# Patient Record
Sex: MALE | Race: WHITE | NOT HISPANIC OR LATINO | Employment: FULL TIME | URBAN - METROPOLITAN AREA
[De-identification: names, ages, dates, MRNs, and addresses within clinical notes are randomized per-mention and may not be internally consistent; named-entity substitution may affect disease eponyms.]

---

## 2017-03-13 RX ORDER — METOPROLOL TARTRATE 50 MG/1
TABLET ORAL
Qty: 60 TABLET | Refills: 3 | Status: SHIPPED | OUTPATIENT
Start: 2017-03-13 | End: 2017-07-24 | Stop reason: SDUPTHER

## 2017-04-03 RX ORDER — CLOPIDOGREL BISULFATE 75 MG/1
TABLET ORAL
Qty: 30 TABLET | Refills: 6 | Status: SHIPPED | OUTPATIENT
Start: 2017-04-03

## 2017-05-07 RX ORDER — ATORVASTATIN CALCIUM 80 MG/1
TABLET, FILM COATED ORAL
Qty: 90 TABLET | Refills: 0 | Status: SHIPPED | OUTPATIENT
Start: 2017-05-07 | End: 2017-08-29 | Stop reason: SDUPTHER

## 2017-07-25 RX ORDER — METOPROLOL TARTRATE 50 MG/1
50 TABLET ORAL 2 TIMES DAILY
Qty: 60 TABLET | Refills: 2 | Status: SHIPPED | OUTPATIENT
Start: 2017-07-25

## 2017-07-25 NOTE — TELEPHONE ENCOUNTER
Please inform Mr Haleigh, I refilled has recent medication request, however he needs a clinic appointment.

## 2017-07-27 ENCOUNTER — TELEPHONE (OUTPATIENT)
Dept: CARDIOLOGY | Facility: CLINIC | Age: 52
End: 2017-07-27

## 2017-07-27 NOTE — TELEPHONE ENCOUNTER
----- Message from Ivette Saini sent at 7/27/2017  2:36 PM CDT -----  Contact: pt daughter  Returning nurse call. Please adv/call [phone].//thanks. cw

## 2017-07-27 NOTE — TELEPHONE ENCOUNTER
Returned pt call spoke with pt daughter states she will talk with financial department regarding pt account being blocked.

## 2017-08-29 RX ORDER — ATORVASTATIN CALCIUM 80 MG/1
TABLET, FILM COATED ORAL
Qty: 30 TABLET | Refills: 0 | Status: SHIPPED | OUTPATIENT
Start: 2017-08-29 | End: 2017-09-25 | Stop reason: SDUPTHER

## 2017-09-25 RX ORDER — ATORVASTATIN CALCIUM 80 MG/1
TABLET, FILM COATED ORAL
Qty: 30 TABLET | Refills: 0 | Status: SHIPPED | OUTPATIENT
Start: 2017-09-25 | End: 2017-10-13 | Stop reason: SDUPTHER

## 2017-10-17 RX ORDER — ATORVASTATIN CALCIUM 80 MG/1
TABLET, FILM COATED ORAL
Qty: 30 TABLET | Refills: 0 | Status: SHIPPED | OUTPATIENT
Start: 2017-10-17

## 2017-11-15 RX ORDER — CLOPIDOGREL BISULFATE 75 MG/1
TABLET ORAL
Qty: 30 TABLET | Refills: 3 | OUTPATIENT
Start: 2017-11-15

## 2017-11-15 RX ORDER — METOPROLOL TARTRATE 50 MG/1
50 TABLET ORAL 2 TIMES DAILY
Qty: 60 TABLET | Refills: 2 | OUTPATIENT
Start: 2017-11-15

## 2017-12-06 RX ORDER — ATORVASTATIN CALCIUM 80 MG/1
TABLET, FILM COATED ORAL
Qty: 30 TABLET | Refills: 0 | OUTPATIENT
Start: 2017-12-06

## 2017-12-13 RX ORDER — METOPROLOL TARTRATE 50 MG/1
50 TABLET ORAL 2 TIMES DAILY
Qty: 60 TABLET | Refills: 2 | OUTPATIENT
Start: 2017-12-13

## 2017-12-13 RX ORDER — CLOPIDOGREL BISULFATE 75 MG/1
TABLET ORAL
Qty: 30 TABLET | Refills: 3 | OUTPATIENT
Start: 2017-12-13

## 2017-12-13 RX ORDER — ATORVASTATIN CALCIUM 80 MG/1
TABLET, FILM COATED ORAL
Qty: 30 TABLET | Refills: 0 | OUTPATIENT
Start: 2017-12-13

## 2017-12-22 RX ORDER — METOPROLOL TARTRATE 50 MG/1
50 TABLET ORAL 2 TIMES DAILY
Qty: 60 TABLET | Refills: 2 | OUTPATIENT
Start: 2017-12-22

## 2018-02-16 ENCOUNTER — TELEPHONE (OUTPATIENT)
Dept: INTERNAL MEDICINE | Facility: CLINIC | Age: 53
End: 2018-02-16

## 2018-02-16 NOTE — TELEPHONE ENCOUNTER
Please notify patient that if he plans to continue primary care with us, he needs follow up. He has only been seen once for hospital follow up 2 years ago. Recent note from his Cardiologist states that he will continue to follow with us for hyperlipidemia.

## 2019-07-14 ENCOUNTER — HOSPITAL ENCOUNTER (EMERGENCY)
Facility: HOSPITAL | Age: 54
Discharge: HOME | End: 2019-07-14
Attending: EMERGENCY MEDICINE
Payer: MEDICAID

## 2019-07-14 VITALS
TEMPERATURE: 98 F | OXYGEN SATURATION: 96 % | SYSTOLIC BLOOD PRESSURE: 143 MMHG | BODY MASS INDEX: 36.17 KG/M2 | RESPIRATION RATE: 18 BRPM | WEIGHT: 272.94 LBS | DIASTOLIC BLOOD PRESSURE: 92 MMHG | HEIGHT: 73 IN | HEART RATE: 56 BPM

## 2019-07-14 DIAGNOSIS — R23.4 WOUND ESCHAR OF FOOT: ICD-10-CM

## 2019-07-14 DIAGNOSIS — L03.116 CELLULITIS OF LEFT FOOT EXCLUDING TOES: Primary | ICD-10-CM

## 2019-07-14 DIAGNOSIS — S99.922A INJURY OF LEFT FOOT: ICD-10-CM

## 2019-07-14 LAB — POCT GLUCOSE: 108 MG/DL (ref 70–110)

## 2019-07-14 PROCEDURE — 25000003 PHARM REV CODE 250: Performed by: EMERGENCY MEDICINE

## 2019-07-14 PROCEDURE — 99283 EMERGENCY DEPT VISIT LOW MDM: CPT | Mod: 25

## 2019-07-14 PROCEDURE — 82962 GLUCOSE BLOOD TEST: CPT

## 2019-07-14 RX ORDER — DOXYCYCLINE 100 MG/1
100 CAPSULE ORAL 2 TIMES DAILY
Qty: 20 CAPSULE | Refills: 0 | Status: SHIPPED | OUTPATIENT
Start: 2019-07-14 | End: 2019-07-24

## 2019-07-14 RX ORDER — ALBUTEROL SULFATE 90 UG/1
90 AEROSOL, METERED RESPIRATORY (INHALATION) 2 TIMES DAILY PRN
COMMUNITY
Start: 2019-04-18

## 2019-07-14 RX ADMIN — NEOMYCIN AND POLYMYXIN B SULFATES AND BACITRACIN ZINC 1 EACH: 400; 3.5; 5 OINTMENT TOPICAL at 07:07

## 2019-07-14 NOTE — ED PROVIDER NOTES
SCRIBE #1 NOTE: I, Lili Johansen, am scribing for, and in the presence of, Tonya Cox MD. I have scribed the entire note.      History      Chief Complaint   Patient presents with    Foot Injury     Wound to R foot since thursday. Black noted to foot with redness/+swelling Pt said he spilled bleach on his foot then wound formed.        Review of patient's allergies indicates:  No Known Allergies     HPI   HPI    7/14/2019, 6:59 PM   History obtained from the patient and daughter      History of Present Illness: Lola Ricardo is a 54 y.o. male patient with PMHx of HTN and a heart attack who presents to the Emergency Department for a wound to the R foot with erythema and swelling. Pt states he spilled bleach on his foot prior to the wound forming about 3 days ago.Pt states he was wearing shoes and socks at the time of the spill. Symptoms are constant and moderate in severity. No mitigating or exacerbating factors reported. Associated sxs include black noted on R foot. Patient denies any fever, chills, diaphoresis, n/v/d, joint swelling, rash, HA, lightheadedness, extremity weakness/numbness, and all other sxs at this time. Pt denies DM. No prior txs reproted. No further complaints or concerns at this time.          Arrival mode: Personal vehicle     PCP: Veronique Dos Santos MD       Past Medical History:  Past Medical History:   Diagnosis Date    Heart attack 02/2016    Hypertension        Past Surgical History:  Past Surgical History:   Procedure Laterality Date    CHOLECYSTECTOMY      CORONARY STENT PLACEMENT  02/2016    GALLBLADDER SURGERY Bilateral     none      ROBOTIC ASSISTED -LAPAROSCOPIC CHOLECYSTECTOMY N/A 5/21/2015    Performed by Kojo Beauchamp MD at [facility]         Family History:  Family History   Problem Relation Age of Onset    Cancer Neg Hx     Hyperlipidemia Neg Hx     Hypertension Neg Hx        Social History:  Social History     Tobacco Use    Smoking status: Former Smoker      Last attempt to quit: 2/19/2016     Years since quitting: 3.4    Tobacco comment: smokes 1ppd x 22 years.  Instructed not to smoke after midnight prior to surgery   Substance and Sexual Activity    Alcohol use: Yes     Alcohol/week: 1.2 oz     Types: 2 Glasses of wine per week     Comment: 2glasses on weekend  No alcohol prior to surgery    Drug use: No    Sexual activity: Yes     Partners: Female       ROS   Review of Systems   Constitutional: Negative for chills, diaphoresis and fever.   HENT: Negative for sore throat.    Respiratory: Negative for shortness of breath.    Cardiovascular: Negative for chest pain.   Gastrointestinal: Negative for nausea.   Genitourinary: Negative for dysuria.   Musculoskeletal: Positive for myalgias (R foot). Negative for back pain.   Skin: Positive for wound (R foot  ). Negative for rash.   Neurological: Negative for weakness, light-headedness, numbness and headaches.   Hematological: Does not bruise/bleed easily.   All other systems reviewed and are negative.    Physical Exam      Initial Vitals [07/14/19 1753]   BP Pulse Resp Temp SpO2   (!) 142/96 66 19 97.7 °F (36.5 °C) 95 %      MAP       --          Physical Exam  Nursing Notes and Vital Signs Reviewed.  Constitutional: Patient is in no acute distress. Well-developed and well-nourished.  Head: Atraumatic. Normocephalic.  Eyes: PERRL. EOM intact. Conjunctivae are not pale. No scleral icterus.  ENT: Mucous membranes are moist. Oropharynx is clear and symmetric.    Neck: Supple. Full ROM. No lymphadenopathy.  Cardiovascular: Regular rate. Regular rhythm. No murmurs, rubs, or gallops. Distal pulses are 2+ and symmetric.  Pulmonary/Chest: No respiratory distress. Clear to auscultation bilaterally. No wheezing or rales.  Abdominal: Soft and non-distended.  There is no tenderness.  No rebound, guarding, or rigidity. Good bowel sounds.  Genitourinary: No CVA tenderness  Musculoskeletal: Moves all extremities. No obvious  "deformities. No edema. No calf tenderness.  Skin: The L mid first metatarsal area has a quarter size area of eschar with mild surrounding erythema and swelling. No induration. No abscess, No foul odor or drainage Warm and dry. DP 2 + and intact, NVI.   Neurological:  Alert, awake, and appropriate.  Normal speech.  No acute focal neurological deficits are appreciated.  Psychiatric: Normal affect. Good eye contact. Appropriate in content.            ED Course    Procedures  ED Vital Signs:  Vitals:    07/14/19 1750 07/14/19 1753 07/14/19 1839 07/14/19 1902   BP:  (!) 142/96 122/77 122/79   Pulse:  66 60 (!) 55   Resp:  19 18    Temp:  97.7 °F (36.5 °C)     TempSrc:  Oral     SpO2:  95% 95% 96%   Weight: 123.8 kg (272 lb 14.9 oz)      Height: 6' 1" (1.854 m)       07/14/19 1932   BP: (!) 143/92   Pulse: (!) 56   Resp:    Temp:    TempSrc:    SpO2: 96%   Weight:    Height:        Abnormal Lab Results:  Labs Reviewed   POCT GLUCOSE   POCT GLUCOSE MONITORING CONTINUOUS        All Lab Results:  Results for orders placed or performed during the hospital encounter of 07/14/19   POCT glucose   Result Value Ref Range    POCT Glucose 108 70 - 110 mg/dL         Imaging Results:  Imaging Results          X-Ray Foot Complete Left (Final result)  Result time 07/14/19 19:23:28    Final result by Nael Fernandez MD (07/14/19 19:23:28)                 Impression:      Normal left foot x-ray.      Electronically signed by: Nael Fernandez MD  Date:    07/14/2019  Time:    19:23             Narrative:    EXAMINATION:  XR FOOT COMPLETE 3 VIEW LEFT    CLINICAL HISTORY:  .  Unspecified injury of left foot, initial encounter    TECHNIQUE:  AP, lateral and oblique views of the left foot were performed.    COMPARISON:  None    FINDINGS:  There is no fracture or dislocation of the left foot.  No foreign body.                                        The Emergency Provider reviewed the vital signs and test results, which are outlined above.    ED " Discussion     7:29 PM: Reassessed pt at this time.  Discussed with pt all pertinent ED information and results. Discussed pt dx and plan of tx. Gave pt all f/u and return to the ED instructions. Pt was instructed to elevate foot. Pt was also instructed to f/u with podiatry within 1-2 days. All questions and concerns were addressed at this time. Pt expresses understanding of information and instructions, and is comfortable with plan to discharge. Pt is stable for discharge.    I discussed with patient and/or family/caretaker that evaluation in the ED does not suggest any emergent or life threatening medical conditions requiring immediate intervention beyond what was provided in the ED, and I believe patient is safe for discharge.  Regardless, an unremarkable evaluation in the ED does not preclude the development or presence of a serious of life threatening condition. As such, patient was instructed to return immediately for any worsening or change in current symptoms.      ED Medication(s):  Medications   neomycin-bacitracnZn-polymyxnB packet 1 each (has no administration in time range)       Follow-up Information     Veronique Dos Santos MD. Schedule an appointment as soon as possible for a visit in 2 days.    Specialty:  Family Medicine  Why:  Return to the Emergency Room, If symptoms worsen  Contact information:  [id number] [place] [address] [address] [place] [address]  [phone]             [facility]OD[facility]. Schedule an appointment as soon as possible for a visit in 2 days.    Specialty:  Podiatry  Why:  Return to the Emergency Room, If symptoms worsen  Contact information:  [address] [facility] [address]  [phone]                New Prescriptions    DOXYCYCLINE (VIBRAMYCIN) 100 MG CAP    Take 1 capsule (100 mg total) by mouth 2 (two) times daily. for 10 days           Medical Decision Making    Medical Decision Making:   Clinical Tests:   Lab Tests: Ordered and Reviewed  Radiological Study:  Ordered and Reviewed           Scribe Attestation:   Scribe #1: I performed the above scribed service and the documentation accurately describes the services I performed. I attest to the accuracy of the note.    Attending:   Physician Attestation Statement for Scribe #1: I, Tnoya Cox MD, personally performed the services described in this documentation, as scribed by Lili Johansen, in my presence, and it is both accurate and complete.          Clinical Impression       ICD-10-CM ICD-9-CM   1. Cellulitis of left foot excluding toes L03.116 682.7   2. Injury of left foot S99.922A 959.7   3. Wound eschar of foot R23.4 782.8       Disposition:   Disposition: Discharged  Condition: Stable         Tonya Cox MD  07/14/19 1944

## 2021-04-28 ENCOUNTER — PATIENT MESSAGE (OUTPATIENT)
Dept: RESEARCH | Facility: HOSPITAL | Age: 56
End: 2021-04-28